# Patient Record
Sex: MALE | Race: WHITE | NOT HISPANIC OR LATINO | URBAN - METROPOLITAN AREA
[De-identification: names, ages, dates, MRNs, and addresses within clinical notes are randomized per-mention and may not be internally consistent; named-entity substitution may affect disease eponyms.]

---

## 2018-07-17 ENCOUNTER — OFFICE VISIT (OUTPATIENT)
Dept: URBAN - METROPOLITAN AREA CLINIC 48 | Facility: CLINIC | Age: 68
End: 2018-07-17
Payer: MEDICARE

## 2018-07-17 DIAGNOSIS — H18.231 SECONDARY CORNEAL EDEMA, RIGHT EYE: Primary | ICD-10-CM

## 2018-07-17 PROCEDURE — 99213 OFFICE O/P EST LOW 20 MIN: CPT | Performed by: OPHTHALMOLOGY

## 2018-07-17 RX ORDER — SODIUM CHLORIDE 50 MG/ML
5 % SOLUTION OPHTHALMIC
Qty: 1 | Refills: 5 | Status: ACTIVE
Start: 2018-07-17

## 2018-07-17 ASSESSMENT — INTRAOCULAR PRESSURE
OD: 13
OS: 11

## 2018-07-17 NOTE — IMPRESSION/PLAN
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 Plan: Patient feels vision is a little better than last visit. Discussed Rx alternatives including possible second surgery. Patient prefers to continue as is. PF & Amisha 128 qid OD.

## 2020-01-28 ENCOUNTER — OFFICE VISIT (OUTPATIENT)
Dept: URBAN - METROPOLITAN AREA CLINIC 48 | Facility: CLINIC | Age: 70
End: 2020-01-28
Payer: MEDICARE

## 2020-01-28 PROCEDURE — 92012 INTRM OPH EXAM EST PATIENT: CPT | Performed by: OPHTHALMOLOGY

## 2020-01-28 ASSESSMENT — INTRAOCULAR PRESSURE
OD: 11
OS: 11

## 2020-01-28 NOTE — IMPRESSION/PLAN
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 Plan: Patient not interested in surgery or other treatments. Patient prefers to continue as is. Continue  PF 12 times a day OD Recommend to wear shatter proof lenses.  

RTC 3 weeks follow up and IOP  check ( long  exam) DE/OCT mac and ultrasound

## 2020-02-19 ENCOUNTER — OFFICE VISIT (OUTPATIENT)
Dept: URBAN - METROPOLITAN AREA CLINIC 48 | Facility: CLINIC | Age: 70
End: 2020-02-19
Payer: MEDICARE

## 2020-02-19 PROCEDURE — 92012 INTRM OPH EXAM EST PATIENT: CPT | Performed by: OPHTHALMOLOGY

## 2020-02-19 RX ORDER — PREDNISOLONE ACETATE 10 MG/ML
1 % SUSPENSION/ DROPS OPHTHALMIC
Qty: 5 | Refills: 2 | Status: ACTIVE
Start: 2020-02-19

## 2020-02-19 RX ORDER — BRIMONIDINE TARTRATE 2 MG/ML
0.2 % SOLUTION/ DROPS OPHTHALMIC
Qty: 5 | Refills: 2 | Status: ACTIVE
Start: 2020-02-19

## 2020-02-19 ASSESSMENT — INTRAOCULAR PRESSURE
OD: 22
OS: 13

## 2020-02-19 NOTE — IMPRESSION/PLAN
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 Plan: Patient not interested in surgery or other treatments. Patient prefers to continue as is. Continue  PF qid  OD x1 week then tid x 1 week then bid x 1 week then qd x 1 week Brimonidine bid OD Recommend to wear shatter proof lenses.  

RTC 3-4 weeks follow up and  IOP Check ( short exam)

## 2022-11-17 ENCOUNTER — OFFICE VISIT (OUTPATIENT)
Dept: URBAN - METROPOLITAN AREA CLINIC 48 | Facility: CLINIC | Age: 72
End: 2022-11-17
Payer: MEDICARE

## 2022-11-17 DIAGNOSIS — H18.231 SECONDARY CORNEAL EDEMA, RIGHT EYE: Primary | ICD-10-CM

## 2022-11-17 DIAGNOSIS — E11.9 TYPE 2 DIABETES MELLITUS WITHOUT COMPLICATIONS: ICD-10-CM

## 2022-11-17 PROCEDURE — 99214 OFFICE O/P EST MOD 30 MIN: CPT | Performed by: STUDENT IN AN ORGANIZED HEALTH CARE EDUCATION/TRAINING PROGRAM

## 2022-11-17 ASSESSMENT — INTRAOCULAR PRESSURE
OS: 12
OD: 19

## 2022-11-17 NOTE — IMPRESSION/PLAN
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 Plan: Patient not interested in treatments. Patient prefers to continue as is. Recommend starting AFT's 
RTC precautions reviewed with patient Scarring noted today, not in previous documentation, but patient denies vision changes, will continue to monitor RTC 1 year DFE

## 2022-11-17 NOTE — IMPRESSION/PLAN
Impression: Type 2 diabetes mellitus without complications: Q52.2.  Plan: - No retinopathy seen on exam today
- Continue glucose, BP, and lipid control as per PCP
- Continue with annual DFE

## 2024-11-18 ENCOUNTER — OFFICE VISIT (OUTPATIENT)
Dept: URBAN - METROPOLITAN AREA CLINIC 46 | Facility: CLINIC | Age: 74
End: 2024-11-18
Payer: MEDICARE

## 2024-11-18 DIAGNOSIS — I10 ESSENTIAL (PRIMARY) HYPERTENSION: ICD-10-CM

## 2024-11-18 DIAGNOSIS — H17.11 CENTRAL CORNEAL OPACITY OF RIGHT EYE: ICD-10-CM

## 2024-11-18 DIAGNOSIS — H26.493 OTHER SECONDARY CATARACT, BILATERAL: ICD-10-CM

## 2024-11-18 DIAGNOSIS — H43.392 OTHER VITREOUS OPACITIES, LEFT EYE: ICD-10-CM

## 2024-11-18 DIAGNOSIS — E11.9 DIABETES MELLITUS TYPE 2 WITHOUT MENTION OF COMPLICATION: Primary | ICD-10-CM

## 2024-11-18 PROCEDURE — 99204 OFFICE O/P NEW MOD 45 MIN: CPT | Performed by: OPTOMETRIST

## 2024-11-18 ASSESSMENT — INTRAOCULAR PRESSURE
OS: 14
OD: 14

## 2025-05-19 ENCOUNTER — OFFICE VISIT (OUTPATIENT)
Dept: URBAN - METROPOLITAN AREA CLINIC 46 | Facility: CLINIC | Age: 75
End: 2025-05-19
Payer: MEDICARE

## 2025-05-19 DIAGNOSIS — H43.392 OTHER VITREOUS OPACITIES, LEFT EYE: ICD-10-CM

## 2025-05-19 DIAGNOSIS — E11.9 DIABETES MELLITUS TYPE 2 WITHOUT MENTION OF COMPLICATION: Primary | ICD-10-CM

## 2025-05-19 DIAGNOSIS — H17.11 CENTRAL CORNEAL OPACITY OF RIGHT EYE: ICD-10-CM

## 2025-05-19 DIAGNOSIS — H26.493 OTHER SECONDARY CATARACT, BILATERAL: ICD-10-CM

## 2025-05-19 PROCEDURE — 99214 OFFICE O/P EST MOD 30 MIN: CPT | Performed by: OPTOMETRIST

## 2025-05-19 PROCEDURE — 92250 FUNDUS PHOTOGRAPHY W/I&R: CPT | Performed by: OPTOMETRIST

## 2025-05-19 ASSESSMENT — INTRAOCULAR PRESSURE
OD: 14
OS: 14